# Patient Record
Sex: FEMALE | Race: ASIAN | NOT HISPANIC OR LATINO | URBAN - METROPOLITAN AREA
[De-identification: names, ages, dates, MRNs, and addresses within clinical notes are randomized per-mention and may not be internally consistent; named-entity substitution may affect disease eponyms.]

---

## 2023-04-15 ENCOUNTER — EMERGENCY (EMERGENCY)
Facility: HOSPITAL | Age: 33
LOS: 1 days | Discharge: HOME | End: 2023-04-15
Admitting: EMERGENCY MEDICINE
Payer: COMMERCIAL

## 2023-04-15 VITALS
DIASTOLIC BLOOD PRESSURE: 49 MMHG | HEART RATE: 74 BPM | RESPIRATION RATE: 16 BRPM | OXYGEN SATURATION: 100 % | TEMPERATURE: 98 F | SYSTOLIC BLOOD PRESSURE: 75 MMHG

## 2023-04-15 VITALS
DIASTOLIC BLOOD PRESSURE: 56 MMHG | OXYGEN SATURATION: 100 % | HEART RATE: 67 BPM | RESPIRATION RATE: 16 BRPM | TEMPERATURE: 98 F | SYSTOLIC BLOOD PRESSURE: 94 MMHG

## 2023-04-15 LAB
ALBUMIN SERPL ELPH-MCNC: 4.2 G/DL — SIGNIFICANT CHANGE UP (ref 3.3–5)
ALP SERPL-CCNC: 49 U/L — SIGNIFICANT CHANGE UP (ref 40–120)
ALT FLD-CCNC: 10 U/L — SIGNIFICANT CHANGE UP (ref 4–33)
ANION GAP SERPL CALC-SCNC: 10 MMOL/L — SIGNIFICANT CHANGE UP (ref 7–14)
AST SERPL-CCNC: 14 U/L — SIGNIFICANT CHANGE UP (ref 4–32)
BASOPHILS # BLD AUTO: 0.02 K/UL — SIGNIFICANT CHANGE UP (ref 0–0.2)
BASOPHILS NFR BLD AUTO: 0.2 % — SIGNIFICANT CHANGE UP (ref 0–2)
BILIRUB SERPL-MCNC: 0.3 MG/DL — SIGNIFICANT CHANGE UP (ref 0.2–1.2)
BLD GP AB SCN SERPL QL: NEGATIVE — SIGNIFICANT CHANGE UP
BUN SERPL-MCNC: 8 MG/DL — SIGNIFICANT CHANGE UP (ref 7–23)
CALCIUM SERPL-MCNC: 8.9 MG/DL — SIGNIFICANT CHANGE UP (ref 8.4–10.5)
CHLORIDE SERPL-SCNC: 101 MMOL/L — SIGNIFICANT CHANGE UP (ref 98–107)
CO2 SERPL-SCNC: 21 MMOL/L — LOW (ref 22–31)
CREAT SERPL-MCNC: 0.51 MG/DL — SIGNIFICANT CHANGE UP (ref 0.5–1.3)
EGFR: 128 ML/MIN/1.73M2 — SIGNIFICANT CHANGE UP
EOSINOPHIL # BLD AUTO: 0.04 K/UL — SIGNIFICANT CHANGE UP (ref 0–0.5)
EOSINOPHIL NFR BLD AUTO: 0.5 % — SIGNIFICANT CHANGE UP (ref 0–6)
GLUCOSE SERPL-MCNC: 102 MG/DL — HIGH (ref 70–99)
HCG SERPL-ACNC: SIGNIFICANT CHANGE UP MIU/ML
HCT VFR BLD CALC: 34 % — LOW (ref 34.5–45)
HGB BLD-MCNC: 11.2 G/DL — LOW (ref 11.5–15.5)
IANC: 6.51 K/UL — SIGNIFICANT CHANGE UP (ref 1.8–7.4)
IMM GRANULOCYTES NFR BLD AUTO: 0.2 % — SIGNIFICANT CHANGE UP (ref 0–0.9)
LYMPHOCYTES # BLD AUTO: 1.25 K/UL — SIGNIFICANT CHANGE UP (ref 1–3.3)
LYMPHOCYTES # BLD AUTO: 15 % — SIGNIFICANT CHANGE UP (ref 13–44)
MCHC RBC-ENTMCNC: 29.4 PG — SIGNIFICANT CHANGE UP (ref 27–34)
MCHC RBC-ENTMCNC: 32.9 GM/DL — SIGNIFICANT CHANGE UP (ref 32–36)
MCV RBC AUTO: 89.2 FL — SIGNIFICANT CHANGE UP (ref 80–100)
MONOCYTES # BLD AUTO: 0.51 K/UL — SIGNIFICANT CHANGE UP (ref 0–0.9)
MONOCYTES NFR BLD AUTO: 6.1 % — SIGNIFICANT CHANGE UP (ref 2–14)
NEUTROPHILS # BLD AUTO: 6.51 K/UL — SIGNIFICANT CHANGE UP (ref 1.8–7.4)
NEUTROPHILS NFR BLD AUTO: 78 % — HIGH (ref 43–77)
NRBC # BLD: 0 /100 WBCS — SIGNIFICANT CHANGE UP (ref 0–0)
NRBC # FLD: 0 K/UL — SIGNIFICANT CHANGE UP (ref 0–0)
PLATELET # BLD AUTO: 261 K/UL — SIGNIFICANT CHANGE UP (ref 150–400)
POTASSIUM SERPL-MCNC: 3.6 MMOL/L — SIGNIFICANT CHANGE UP (ref 3.5–5.3)
POTASSIUM SERPL-SCNC: 3.6 MMOL/L — SIGNIFICANT CHANGE UP (ref 3.5–5.3)
PROT SERPL-MCNC: 6.6 G/DL — SIGNIFICANT CHANGE UP (ref 6–8.3)
RBC # BLD: 3.81 M/UL — SIGNIFICANT CHANGE UP (ref 3.8–5.2)
RBC # FLD: 14 % — SIGNIFICANT CHANGE UP (ref 10.3–14.5)
RH IG SCN BLD-IMP: POSITIVE — SIGNIFICANT CHANGE UP
SODIUM SERPL-SCNC: 132 MMOL/L — LOW (ref 135–145)
WBC # BLD: 8.35 K/UL — SIGNIFICANT CHANGE UP (ref 3.8–10.5)
WBC # FLD AUTO: 8.35 K/UL — SIGNIFICANT CHANGE UP (ref 3.8–10.5)

## 2023-04-15 PROCEDURE — 99284 EMERGENCY DEPT VISIT MOD MDM: CPT

## 2023-04-15 PROCEDURE — 76817 TRANSVAGINAL US OBSTETRIC: CPT | Mod: 26

## 2023-04-15 RX ORDER — SODIUM CHLORIDE 9 MG/ML
2000 INJECTION INTRAMUSCULAR; INTRAVENOUS; SUBCUTANEOUS ONCE
Refills: 0 | Status: COMPLETED | OUTPATIENT
Start: 2023-04-15 | End: 2023-04-15

## 2023-04-15 RX ORDER — FAMOTIDINE 10 MG/ML
20 INJECTION INTRAVENOUS ONCE
Refills: 0 | Status: COMPLETED | OUTPATIENT
Start: 2023-04-15 | End: 2023-04-15

## 2023-04-15 RX ORDER — ONDANSETRON 8 MG/1
1 TABLET, FILM COATED ORAL
Qty: 20 | Refills: 0
Start: 2023-04-15 | End: 2023-04-19

## 2023-04-15 RX ORDER — FAMOTIDINE 10 MG/ML
1 INJECTION INTRAVENOUS
Qty: 7 | Refills: 0
Start: 2023-04-15 | End: 2023-04-21

## 2023-04-15 RX ORDER — ONDANSETRON 8 MG/1
4 TABLET, FILM COATED ORAL ONCE
Refills: 0 | Status: COMPLETED | OUTPATIENT
Start: 2023-04-15 | End: 2023-04-15

## 2023-04-15 RX ADMIN — ONDANSETRON 4 MILLIGRAM(S): 8 TABLET, FILM COATED ORAL at 15:33

## 2023-04-15 RX ADMIN — SODIUM CHLORIDE 2000 MILLILITER(S): 9 INJECTION INTRAMUSCULAR; INTRAVENOUS; SUBCUTANEOUS at 15:33

## 2023-04-15 RX ADMIN — FAMOTIDINE 20 MILLIGRAM(S): 10 INJECTION INTRAVENOUS at 15:33

## 2023-04-15 NOTE — ED PROVIDER NOTE - PROGRESS NOTE DETAILS
ARIANE Abdi - patient reassessed, sates feeling better. tolerated PO without issue - still mild nausea but overall improved. labs wnl. stable for dc. has obgyn appt next week.

## 2023-04-15 NOTE — ED PROVIDER NOTE - PATIENT PORTAL LINK FT
You can access the FollowMyHealth Patient Portal offered by [facility] by registering at the following website: [email or web address]. By joining [employer]’s FollowMyHealth portal, you will also be able to view your health information using other applications (apps) compatible with our system.

## 2023-04-15 NOTE — ED PROVIDER NOTE - NSFOLLOWUPINSTRUCTIONS_ED_ALL_ED_FT
Hyperemesis Gravidarum    WHAT YOU NEED TO KNOW:    Hyperemesis gravidarum is a severe form of nausea and vomiting that happens during pregnancy. Hyperemesis is more severe than morning sickness. It may cause you to have nausea or vomiting all day for many days. It may also keep you from getting enough food and liquid.    DISCHARGE INSTRUCTIONS:    Seek care immediately if:    You have signs of severe dehydration including little to no urine and dry mouth or lips.    You have severe stomach pain.    You feel too weak or dizzy to stand up.    You see blood in your vomit or bowel movements.  Contact your healthcare provider if:    You cannot keep any food or liquid down.    You are losing weight.    You have a fever.    You have questions or concerns about your condition or care.  Medicines:    Medicines, vitamins, or supplements may be given to help decrease nausea and vomiting.    Take your medicine as directed. Contact your healthcare provider if you think your medicine is not helping or if you have side effects. Tell him of her if you are allergic to any medicine. Keep a list of the medicines, vitamins, and herbs you take. Include the amounts, and when and why you take them. Bring the list or the pill bottles to follow-up visits. Carry your medicine list with you in case of an emergency.  Manage your symptoms:    Eat small amounts of food every 1 to 2 hours. Some examples of good foods to eat include broth, toast, fruit, eggs, gelatin, or cottage cheese. Do not eat spicy or high-fat foods. Try to eat crackers before getting out of bed each morning. Foods and drinks with ginger, such as ginger ale, may help to decrease nausea and vomiting.    Drink liquids as directed. You may need to drink small amounts of liquid often to prevent dehydration. Ask how much liquid to drink each day and which liquids are best for you.    Rest when you need to. Start activity slowly and work up to your usual routine as you start to feel better.    Avoid things that may make hyperemesis worse. Avoid odors, heat, and humidity. Limit noise and flickering lights.    Weigh yourself daily if directed by your healthcare provider. You may need to keep a record of your daily weights for your healthcare provider. He or she may want to make sure you are not losing too much weight.  Follow up with your healthcare provider as directed: Write down your questions so you remember to ask them during your visits.    Hiperémesis gravídica    LO QUE NECESITA SABER:    La hiperémesis gravídica es wesley forma grave de náuseas y vómitos que se presenta sandra el embarazo. La hiperémesis es más grave que el malestar matutino. Podría provocarle náuseas o vómitos sandra todo el día a lo magda de varios días. También podría impedirle que consuma suficientes alimentos y líquidos.    INSTRUCCIONES SOBRE EL AGNES HOSPITALARIA:    Busque atención médica de inmediato si:    Usted presenta señales graves de deshidratación incluyendo que orina poco o nada en lo absoluto y tiene la boca o los labios secos.    Usted tiene dolor de estómago severo.    Usted se siente demasiado débil o mareado crystal para ponerse de pie.    Usted nota saadia en oates vómito o en ronnie deposiciones.  Comuníquese con oates médico si:    Usted no puede retener ningún alimento ni líquido en el estómago.    Usted está bajando de peso.    Tiene fiebre.    Usted tiene preguntas o inquietudes acerca de oates condición o cuidado.  Medicamentos:    Los medicamentos, vitaminas o suplementosse los podrían administrar para aliviar las náuseas y los vómitos.    [place] ronnie medicamentos crystal se le haya indicado.Consulte con oates médico si usted taylor que oates medicamento no le está ayudando o si presenta efectos secundarios. Infórmele si es alérgico a algún medicamento. Mantenga wesley lista actualizada de los medicamentos, las vitaminas y los productos herbales que adam. Incluya los siguientes datos de los medicamentos: cantidad, frecuencia y motivo de administración. Traiga con usted la lista o los envases de las píldoras a ronnie citas de seguimiento. Lleve la lista de los medicamentos con usted en ramandeep de wesley emergencia.  El manejo de ronnie síntomas:    Consuma porciones pequeñas de comida cada 1 a 2 horas.Algunos ejemplos de unos alimentos buenos incluyen caldo, pan reza, frutas, huevos, gelatina o cuajada (queso cottage). No coma alimentos picantes ni grasosos. Trate de comer galletas saladas antes de levantarse de la cama cada mañana. Alimentos o bebidas con jengibre, crystal la gaseosa de ginger ale, podrían ayudar a que tenga menos náuseas y vómitos.    [place] líquidos crystal se le haya indicado.Usted podría tener que bryant unos sorbos de líquido con frecuencia para evitar la deshidratación. Pregunte cuánto líquido debe bryant cada día y cuáles líquidos son los más adecuados para usted.    Descanse cuando lo necesite.Comience wesley actividad lentamente y vuelva a oates rutina normal conforme se empiece a sentir mejor.    Evite las cosas que pueden empeorar la hiperémesis.Evite los olores, el calor y la humedad. Limite los ruidos y las luces parpadeantes.    Todos los federico se debe pesar según las indicaciones de oates médico.Es posible que oates médico necesite que usted lleve un registro de oates peso todos los días. Martin vez quiera asegurarse de que no está perdiendo mucho peso.  Acuda a ronnie consultas de control con oates médico según le indicaron.Anote ronnie preguntas para que se acuerde de hacerlas sandra ronnie visitas.

## 2023-04-15 NOTE — ED PROVIDER NOTE - OBJECTIVE STATEMENT
34 y/o female  ~11 wks pregnant presenting to ER c/o nausea vomiting x 2 weeks. Pt.  aiding in translation  - states that for the past 2weeks has been experiencing intermittent nausea and vomiting but over past 2-3 days these symptoms have worsened -  sates has been able to keep minimal food and liquids down this morning and felt very weak. also c/o mild pigastric pain and burning in throat after vomiting. States had some hypermesis during first pregnency but not as bad as this. Denies fever chills vaginal bleeding LOC.  Had US last week confirming IUP - OBGYN Dr. Nino in [place].

## 2023-04-15 NOTE — ED ADULT NURSE NOTE - OBJECTIVE STATEMENT
pt A&ox4, coming to ED for 3 days of vomiting. pt state she has vomited every hour, states vomit is nonbloody and she can not hold down any PO intake. pt states she is 7 weeks pregnant. pt denies abnormal vaginal discharge or bleeding. .  pt denies Chest pain and SOB. pt denies H/A, Dizziness, lightheadedness, and radiating chest pain. breathing is spontaneous and unlabored. sating 99% on RA. right 18g ac IV placed Labs drawn and sent as per ordered. Bed in lowest position, call bell within reach, all other safety and comfort measures provided. awaiting labs results and further orders.

## 2023-04-15 NOTE — ED PROVIDER NOTE - CLINICAL SUMMARY MEDICAL DECISION MAKING FREE TEXT BOX
34 y/o female  11wks pregnant c/o nausea and vomiting x 2 weeks (worsening)  -likely hyperemesis gravidarum, r/o electrolyte deficiinecy/ dehydration  -labs ua   -tvus  -zofran, iv fluids  -reassess

## 2025-02-19 PROBLEM — Z00.00 ENCOUNTER FOR PREVENTIVE HEALTH EXAMINATION: Status: ACTIVE | Noted: 2025-02-19

## 2025-02-20 ENCOUNTER — APPOINTMENT (OUTPATIENT)
Dept: OTOLARYNGOLOGY | Facility: CLINIC | Age: 35
End: 2025-02-20